# Patient Record
Sex: FEMALE | Race: WHITE | Employment: FULL TIME | RURAL
[De-identification: names, ages, dates, MRNs, and addresses within clinical notes are randomized per-mention and may not be internally consistent; named-entity substitution may affect disease eponyms.]

---

## 2017-06-02 ENCOUNTER — TELEPHONE (OUTPATIENT)
Dept: SURGERY | Age: 52
End: 2017-06-02

## 2017-06-15 ENCOUNTER — TRANSCRIBE ORDERS (OUTPATIENT)
Dept: MAMMOGRAPHY | Facility: HOSPITAL | Age: 52
End: 2017-06-15

## 2017-06-15 ENCOUNTER — HOSPITAL ENCOUNTER (OUTPATIENT)
Dept: MAMMOGRAPHY | Facility: HOSPITAL | Age: 52
Discharge: HOME | End: 2017-06-15
Admitting: OBSTETRICS & GYNECOLOGY

## 2017-06-15 DIAGNOSIS — Z13.9 SCREENING: ICD-10-CM

## 2017-06-15 DIAGNOSIS — Z13.9 SCREENING: Primary | ICD-10-CM

## 2017-06-15 PROCEDURE — 77063 BREAST TOMOSYNTHESIS BI: CPT

## 2017-06-15 PROCEDURE — G0202 SCR MAMMO BI INCL CAD: HCPCS

## 2018-07-05 ENCOUNTER — TRANSCRIBE ORDERS (OUTPATIENT)
Dept: MAMMOGRAPHY | Facility: HOSPITAL | Age: 53
End: 2018-07-05

## 2018-07-05 DIAGNOSIS — Z12.39 BREAST CANCER SCREENING: Primary | ICD-10-CM

## 2018-08-08 ENCOUNTER — APPOINTMENT (OUTPATIENT)
Dept: MAMMOGRAPHY | Facility: HOSPITAL | Age: 53
End: 2018-08-08

## 2018-08-17 ENCOUNTER — HOSPITAL ENCOUNTER (OUTPATIENT)
Dept: MAMMOGRAPHY | Facility: HOSPITAL | Age: 53
Discharge: HOME | End: 2018-08-17
Admitting: OBSTETRICS & GYNECOLOGY

## 2018-08-17 DIAGNOSIS — Z12.39 BREAST CANCER SCREENING: ICD-10-CM

## 2018-08-17 PROCEDURE — 77063 BREAST TOMOSYNTHESIS BI: CPT

## 2018-08-17 PROCEDURE — 77067 SCR MAMMO BI INCL CAD: CPT

## 2019-08-21 ENCOUNTER — TRANSCRIBE ORDERS (OUTPATIENT)
Dept: ADMINISTRATIVE | Facility: HOSPITAL | Age: 54
End: 2019-08-21

## 2019-08-21 DIAGNOSIS — Z12.31 ENCOUNTER FOR SCREENING MAMMOGRAM FOR MALIGNANT NEOPLASM OF BREAST: Primary | ICD-10-CM

## 2019-09-26 ENCOUNTER — APPOINTMENT (OUTPATIENT)
Dept: MAMMOGRAPHY | Facility: HOSPITAL | Age: 54
End: 2019-09-26

## 2019-10-29 ENCOUNTER — HOSPITAL ENCOUNTER (OUTPATIENT)
Dept: CT IMAGING | Facility: HOSPITAL | Age: 54
Discharge: HOME | End: 2019-10-29
Payer: COMMERCIAL

## 2019-10-29 DIAGNOSIS — G89.29 CHRONIC INTRACTABLE HEADACHE, UNSPECIFIED HEADACHE TYPE: ICD-10-CM

## 2019-10-29 DIAGNOSIS — R51.9 CHRONIC INTRACTABLE HEADACHE, UNSPECIFIED HEADACHE TYPE: ICD-10-CM

## 2019-10-29 PROCEDURE — 70450 CT HEAD/BRAIN W/O DYE: CPT

## 2019-11-12 ENCOUNTER — HOSPITAL ENCOUNTER (OUTPATIENT)
Dept: MAMMOGRAPHY | Facility: HOSPITAL | Age: 54
Discharge: HOME | End: 2019-11-12
Admitting: OBSTETRICS & GYNECOLOGY

## 2019-11-12 DIAGNOSIS — Z12.31 ENCOUNTER FOR SCREENING MAMMOGRAM FOR MALIGNANT NEOPLASM OF BREAST: ICD-10-CM

## 2019-11-12 PROCEDURE — 77067 SCR MAMMO BI INCL CAD: CPT

## 2019-11-12 PROCEDURE — 77063 BREAST TOMOSYNTHESIS BI: CPT

## 2020-06-03 ENCOUNTER — HOSPITAL ENCOUNTER (OUTPATIENT)
Dept: GENERAL RADIOLOGY | Facility: HOSPITAL | Age: 55
Discharge: HOME | End: 2020-06-03
Admitting: NURSE PRACTITIONER

## 2020-06-03 ENCOUNTER — LAB (OUTPATIENT)
Dept: LAB | Facility: HOSPITAL | Age: 55
End: 2020-06-03

## 2020-06-03 ENCOUNTER — OFFICE VISIT (OUTPATIENT)
Dept: NEUROLOGY | Facility: CLINIC | Age: 55
End: 2020-06-03

## 2020-06-03 VITALS
BODY MASS INDEX: 29.63 KG/M2 | WEIGHT: 161 LBS | HEART RATE: 77 BPM | DIASTOLIC BLOOD PRESSURE: 60 MMHG | OXYGEN SATURATION: 98 % | TEMPERATURE: 97.2 F | HEIGHT: 62 IN | SYSTOLIC BLOOD PRESSURE: 102 MMHG

## 2020-06-03 DIAGNOSIS — I67.89 CEREBRAL MICROVASCULAR DISEASE: ICD-10-CM

## 2020-06-03 DIAGNOSIS — R51.9 TEMPORAL PAIN: Primary | ICD-10-CM

## 2020-06-03 DIAGNOSIS — G43.009 MIGRAINE WITHOUT AURA AND WITHOUT STATUS MIGRAINOSUS, NOT INTRACTABLE: ICD-10-CM

## 2020-06-03 DIAGNOSIS — G43.709 CHRONIC MIGRAINE WITHOUT AURA WITHOUT STATUS MIGRAINOSUS, NOT INTRACTABLE: ICD-10-CM

## 2020-06-03 DIAGNOSIS — G44.1 OTHER VASCULAR HEADACHE: ICD-10-CM

## 2020-06-03 DIAGNOSIS — M54.2 CERVICAL PAIN (NECK): ICD-10-CM

## 2020-06-03 DIAGNOSIS — R51.9 TEMPORAL PAIN: ICD-10-CM

## 2020-06-03 PROBLEM — H91.90 HEARING LOSS: Status: ACTIVE | Noted: 2019-11-25

## 2020-06-03 PROBLEM — G43.909 MIGRAINE WITHOUT STATUS MIGRAINOSUS, NOT INTRACTABLE: Status: ACTIVE | Noted: 2020-06-03

## 2020-06-03 PROBLEM — F51.01 PRIMARY INSOMNIA: Status: ACTIVE | Noted: 2020-06-03

## 2020-06-03 PROBLEM — J01.00 ACUTE MAXILLARY SINUSITIS: Status: ACTIVE | Noted: 2020-06-03

## 2020-06-03 PROBLEM — J20.9 ACUTE BRONCHITIS: Status: ACTIVE | Noted: 2020-06-03

## 2020-06-03 PROCEDURE — 85025 COMPLETE CBC W/AUTO DIFF WBC: CPT

## 2020-06-03 PROCEDURE — 86140 C-REACTIVE PROTEIN: CPT

## 2020-06-03 PROCEDURE — 86038 ANTINUCLEAR ANTIBODIES: CPT

## 2020-06-03 PROCEDURE — 72040 X-RAY EXAM NECK SPINE 2-3 VW: CPT

## 2020-06-03 PROCEDURE — 80053 COMPREHEN METABOLIC PANEL: CPT

## 2020-06-03 PROCEDURE — 84443 ASSAY THYROID STIM HORMONE: CPT

## 2020-06-03 PROCEDURE — 36415 COLL VENOUS BLD VENIPUNCTURE: CPT

## 2020-06-03 PROCEDURE — 82746 ASSAY OF FOLIC ACID SERUM: CPT

## 2020-06-03 PROCEDURE — 83921 ORGANIC ACID SINGLE QUANT: CPT

## 2020-06-03 PROCEDURE — 85652 RBC SED RATE AUTOMATED: CPT

## 2020-06-03 PROCEDURE — 86431 RHEUMATOID FACTOR QUANT: CPT

## 2020-06-03 PROCEDURE — 84479 ASSAY OF THYROID (T3 OR T4): CPT

## 2020-06-03 PROCEDURE — 84436 ASSAY OF TOTAL THYROXINE: CPT

## 2020-06-03 PROCEDURE — 82306 VITAMIN D 25 HYDROXY: CPT

## 2020-06-03 PROCEDURE — 82607 VITAMIN B-12: CPT

## 2020-06-03 PROCEDURE — 99204 OFFICE O/P NEW MOD 45 MIN: CPT | Performed by: NURSE PRACTITIONER

## 2020-06-03 RX ORDER — ONDANSETRON 4 MG/1
4 TABLET, FILM COATED ORAL EVERY 8 HOURS PRN
COMMUNITY
End: 2022-08-12

## 2020-06-03 RX ORDER — ESTRADIOL 1 MG/1
TABLET ORAL
COMMUNITY
Start: 2020-03-10 | End: 2022-08-12

## 2020-06-03 RX ORDER — PREDNISONE 10 MG/1
TABLET ORAL
Qty: 42 TABLET | Refills: 0 | Status: SHIPPED | OUTPATIENT
Start: 2020-06-03 | End: 2020-07-29

## 2020-06-03 RX ORDER — ACETAMINOPHEN 500 MG
1000 TABLET ORAL EVERY 6 HOURS PRN
COMMUNITY
End: 2022-08-12

## 2020-06-03 RX ORDER — TOPIRAMATE 50 MG/1
50 CAPSULE, EXTENDED RELEASE ORAL NIGHTLY
Qty: 30 CAPSULE | Refills: 2 | Status: SHIPPED | OUTPATIENT
Start: 2020-06-03 | End: 2020-07-24

## 2020-06-03 RX ORDER — MEDROXYPROGESTERONE ACETATE 2.5 MG/1
TABLET ORAL
COMMUNITY
Start: 2020-04-10 | End: 2022-08-12

## 2020-06-03 RX ORDER — RIZATRIPTAN BENZOATE 10 MG/1
10 TABLET, ORALLY DISINTEGRATING ORAL ONCE AS NEEDED
Qty: 9 TABLET | Refills: 5 | Status: SHIPPED | OUTPATIENT
Start: 2020-06-03 | End: 2021-03-05

## 2020-06-03 RX ORDER — ZOLPIDEM TARTRATE 5 MG/1
5 TABLET ORAL NIGHTLY PRN
COMMUNITY

## 2020-06-03 RX ORDER — CETIRIZINE HYDROCHLORIDE 5 MG/1
5 TABLET ORAL DAILY
COMMUNITY

## 2020-06-03 RX ORDER — RIMEGEPANT SULFATE 75 MG/75MG
75 TABLET, ORALLY DISINTEGRATING ORAL DAILY PRN
Qty: 10 TABLET | Refills: 5 | Status: SHIPPED | OUTPATIENT
Start: 2020-06-03 | End: 2021-03-05

## 2020-06-03 NOTE — PATIENT INSTRUCTIONS
TROKENDI XR 50MG 1 PILL EVERY NIGHT-PREVENT MIGRAINES.    NURTEC ODT 75MG TAKE 1 PILL AT ONSET OF MIGRAINE AS NEEDED. MAX 1/DAY. MAX 10/MONTH.-ABORT MIGRAINES.    RIZATRIPTAN 10MG TAKE 1 PILL AT ONSET OF MIGRAINE IF AT HOME. MAY REPEAT ONCE IN 2 HOURS. MAX 2/DAY MAX 9/MONTH. CAN MAKE YOU TIRED. TRY AT HOME FIRST.-ABORT MIGRAINES.    PREDNISONE PACK AT PHARMACY-TAKE THE NEXT TIME THE LEFT TEMPLE PAIN STARTS AND LET US KNOW IF THIS HELPS.    LABS TODAY.    CTA HEAD AND NECK-OUR SCHEDULING WILL CALL YOU.    Xray of neck today.    Start physical therapy-printed order.

## 2020-06-03 NOTE — PROGRESS NOTES
Subjective:     Patient ID: Janell Salgado is a 54 y.o. female.    CC:   Chief Complaint   Patient presents with   • Migraine   • Headache       HPI:   History of Present Illness     This is a very pleasant 54-year-old female who presents for initial neurological evaluation of migraine headaches present for the past 3 years.  She was referred by her primary care provider for further evaluation.    She tells me she gets 2 different types of headaches. Headaches are worsening in severity and frequency.    The first headache is always severe left temple pain, always last 3 days, the left temple is sore to touch, she has throbbing but no stabbing, nausea, photophobia is more than phonophobia, pain is a 7 out of 10 and she has to lay in the bed.  She tells me she will have 1 of these headaches every 10 to 12 days in the last 3 days each time.     The second type of headache she gets is in the middle of the back of her head, skull base, and middle of the forehead, she will have throbbing, pain 4-6 out of 10, nausea, photophobia and phonophobia, lasting 4 to 24 hours and she will have 1 of these every 3 to 4 days. She tells me she knows she has had blood work but she is not sure what type of blood work.  She has not had inflammatory work-up as far as she is aware.  She tells me that her left temple is sore to touch when she gets the specific headaches but there is no rash or swelling in that area that she can recall.  She gets her eyes examined annually.  She knows she is having at least 15-20 headache days per month total with at least 10 days being considered a migraine.  She is really desperate for relief of her migraines.    She tells me she cannot really find any triggers for her headache starting.  She had no headaches prior to 3 years ago.  She did have a right frozen shoulder about 3 years ago but this would be the only change in her health.  No migraines run in her family and she has had no migraines prior  to 3 years ago.  She was prescribed Trokendi XR 25 mg to take by her PCP and she tells me she took this about 6 to 8 weeks but saw no change so she stopped taking it.  She really does not like taking medications.  She was also prescribed rizatriptan to take as needed for migraines but she works and she is fearful to take this.  She is not tried any triptans.  She does have baseline hypotension and would not be a candidate for a beta-blocker.  She actually was referred to an ear nose and throat specialist for noticing left ear hearing loss.      She had an MRI of the brain with and without contrast on 12/9/2019 at [facility] and radiology report reads minimal chronic small vessel ischemic changes with no enhancing lesions including no 7th or 8th nerve complex region lesions.  There was mild right mastoiditis sequela and left frontal sinus inflammation.  This is the radiology interpretation.  I did attempt to review her imaging today and after 2 attempts I am able to see a few views but not the entire MRI.  I do not see any acute intracranial abnormalities and just a few chronic small vessel ischemic changes consistent with her age.    She also has had neck pain, some tension in shoulders for many years, has seen a chiropractor on and off for years with no improvement. Remote xray of C spine in past, unsure of results. Has never done PT and would like to try this. Denies recent trauma.    The following portions of the patient's history were reviewed and updated as appropriate: allergies, current medications, past family history, past medical history, past social history, past surgical history and problem list.    Past Medical History:   Diagnosis Date   • Frozen shoulder 2017   • HL (hearing loss) 2019   • Migraine 2017   • Vision loss        Past Surgical History:   Procedure Laterality Date   • LASER ABLATION         Social History     Socioeconomic History   • Marital status:      Spouse name: Not on  file   • Number of children: Not on file   • Years of education: Not on file   • Highest education level: Not on file   Tobacco Use   • Smoking status: Former Smoker   • Smokeless tobacco: Never Used   • Tobacco comment: 8 years ago   Substance and Sexual Activity   • Alcohol use: Not Currently   • Drug use: Never   • Sexual activity: Yes       Family History   Problem Relation Age of Onset   • Breast cancer Paternal Grandmother    • Anxiety disorder Mother    • Hypertension Father    • Diabetes Father    • Breast cancer Maternal Grandmother    • Stroke Maternal Grandfather         Review of Systems   Constitutional: Positive for fatigue. Negative for chills, fever and unexpected weight change.   HENT: Negative for ear pain, hearing loss, nosebleeds, rhinorrhea and sore throat.    Eyes: Positive for visual disturbance (chronic, has eye exams every year). Negative for photophobia, pain, discharge and itching.   Respiratory: Negative for cough, chest tightness, shortness of breath and wheezing.    Cardiovascular: Negative for chest pain, palpitations and leg swelling.   Gastrointestinal: Positive for nausea. Negative for abdominal pain, blood in stool, constipation, diarrhea and vomiting.   Genitourinary: Negative for dysuria, frequency, hematuria and urgency.   Musculoskeletal: Negative for arthralgias, back pain, gait problem, joint swelling, myalgias, neck pain and neck stiffness.   Skin: Negative for rash and wound.   Allergic/Immunologic: Negative for environmental allergies and food allergies.   Neurological: Positive for headaches. Negative for dizziness, tremors, seizures, syncope, speech difficulty, weakness, light-headedness and numbness.   Hematological: Negative for adenopathy. Does not bruise/bleed easily.   Psychiatric/Behavioral: Positive for sleep disturbance (chronic insomnia-rx ambien from pcp). Negative for agitation, confusion, decreased concentration, hallucinations and suicidal ideas. The patient  "is not nervous/anxious.    All other systems reviewed and are negative.       Objective:  /60   Pulse 77   Temp 97.2 °F (36.2 °C)   Ht 157.5 cm (62\")   Wt 73 kg (161 lb)   SpO2 98%   BMI 29.45 kg/m²     Neurologic Exam     Mental Status   Oriented to person, place, and time.   Registration: recalls 3 of 3 objects. Recall at 5 minutes: recalls 3 of 3 objects. Follows 3 step commands.   Attention: normal. Concentration: normal.   Speech: speech is normal   Level of consciousness: alert  Knowledge: good and consistent with education. Able to perform simple calculations.   Able to name object. Able to read. Able to repeat. Able to write. Normal comprehension.     Cranial Nerves   Cranial nerves II through XII intact.     Motor Exam   Muscle bulk: normal  Overall muscle tone: normal    Strength   Strength 5/5 throughout.     Sensory Exam   Light touch normal.   Vibration normal.   Proprioception normal.   Pinprick normal.     Gait, Coordination, and Reflexes     Gait  Gait: normal    Coordination   Romberg: negative  Finger to nose coordination: normal  Heel to shin coordination: normal  Tandem walking coordination: normal    Tremor   Resting tremor: absent  Intention tremor: absent  Action tremor: absent    Reflexes   Right brachioradialis: 2+  Left brachioradialis: 2+  Right biceps: 2+  Left biceps: 2+  Right triceps: 2+  Left triceps: 2+  Right patellar: 2+  Left patellar: 2+  Right achilles: 2+  Left achilles: 2+  Right : 2+  Left : 2+  Right plantar: normal  Left plantar: normal  Right Law: absent  Left Law: absent  Right ankle clonus: absent  Left ankle clonus: absent      Physical Exam   Constitutional: She is oriented to person, place, and time. She appears well-developed and well-nourished.   Eyes:   Fundoscopic exam:       The right eye shows no papilledema. The right eye shows red reflex.        The left eye shows no papilledema. The left eye shows red reflex.   Neck: Muscular " tenderness present. No spinous process tenderness present. Carotid bruit is not present. No neck rigidity. Decreased range of motion present. No edema and no erythema present.   Cardiovascular: Normal rate, regular rhythm, S2 normal and normal heart sounds.   Pulmonary/Chest: Effort normal and breath sounds normal.   Neurological: She is oriented to person, place, and time. She has normal strength. She has a normal Finger-Nose-Finger Test, a normal Heel to Shin Test, a normal Romberg Test and a normal Tandem Gait Test. Gait normal.   Reflex Scores:       Tricep reflexes are 2+ on the right side and 2+ on the left side.       Bicep reflexes are 2+ on the right side and 2+ on the left side.       Brachioradialis reflexes are 2+ on the right side and 2+ on the left side.       Patellar reflexes are 2+ on the right side and 2+ on the left side.       Achilles reflexes are 2+ on the right side and 2+ on the left side.  Psychiatric: She has a normal mood and affect. Her speech is normal and behavior is normal. Judgment and thought content normal. Cognition and memory are normal.       Assessment/Plan:       Janell was seen today for migraine and headache.    Diagnoses and all orders for this visit:    Temporal pain  -     C-reactive Protein; Future  -     Sedimentation Rate; Future  -     CARRIE by IFA, Reflex 9-biomarkers profile; Future  -     Vitamin D 25 Hydroxy; Future  -     Vitamin B12 & Folate; Future  -     Thyroid Panel With TSH; Future  -     Rheumatoid Factor; Future  -     Comprehensive Metabolic Panel; Future  -     CBC & Differential; Future  -     Methylmalonic Acid, Serum; Future  -     CT Angiogram Neck; Future  -     CT Angiogram Head; Future  -     predniSONE (DELTASONE) 10 MG tablet; Take 6 tabs x 2 days, Take 5 tabs x 2 days, take 4 tabs x 2 days, take 3 tabs x 2 days, take 2 tabs x 2 days and 1 tab x 2 days and stop    Chronic migraine without aura without status migrainosus, not intractable  -      TROKENDI XR 50 MG capsule sustained-release 24 hr; Take 1 capsule by mouth Every Night.  -     Ambulatory Referral to Physical Therapy Evaluate and treat    Cerebral microvascular disease  -     C-reactive Protein; Future  -     Sedimentation Rate; Future  -     CARRIE by IFA, Reflex 9-biomarkers profile; Future  -     Vitamin D 25 Hydroxy; Future  -     Vitamin B12 & Folate; Future  -     Thyroid Panel With TSH; Future  -     Rheumatoid Factor; Future  -     Comprehensive Metabolic Panel; Future  -     CBC & Differential; Future  -     Methylmalonic Acid, Serum; Future  -     CT Angiogram Neck; Future  -     CT Angiogram Head; Future    Other vascular headache  -     CT Angiogram Neck; Future  -     CT Angiogram Head; Future    Migraine without aura and without status migrainosus, not intractable  -     RIMEGEPANT 75 MG dispersible tablet; Take 75 mg by mouth Daily As Needed (migraine).  -     rizatriptan MLT (MAXALT-MLT) 10 MG disintegrating tablet; Place 1 tablet on the tongue 1 (One) Time As Needed for Migraine for up to 1 dose. May repeat in 2 hours if needed    Cervical pain (neck)  -     XR Spine Cervical 2 or 3 View; Future  -     Ambulatory Referral to Physical Therapy Evaluate and treat           Labs today to rule out temporal arteritis or other etiology of worsening headaches.  I do recommend CTA of the head and neck to evaluate for a vasculitis or stenosis possibly contributing to her new onset headaches at age 51.  We will have her restart Trokendi XR 50 mg at night since she seemed to tolerate but did not see improvement on the 25 mg.  Other as needed medications are prescribed.  Prednisone if she has another temple pain headache to see if she responds.  If she does we will refer her to rheumatology for further evaluation.  She will follow-up in office in 8 weeks or sooner if needed.    Reviewed medications, potential side effects and signs and symptoms to report. Discussed risk versus benefits of  treatment plan with patient and/or family-including medications, labs and radiology that may be ordered. Addressed questions and concerns during visit. Patient and/or family verbalized understanding and agree with plan.    Patient Instructions:  TROKENDI XR 50MG 1 PILL EVERY NIGHT-PREVENT MIGRAINES.    NURTEC ODT 75MG TAKE 1 PILL AT ONSET OF MIGRAINE AS NEEDED. MAX 1/DAY. MAX 10/MONTH.-ABORT MIGRAINES.    RIZATRIPTAN 10MG TAKE 1 PILL AT ONSET OF MIGRAINE IF AT HOME. MAY REPEAT ONCE IN 2 HOURS. MAX 2/DAY MAX 9/MONTH. CAN MAKE YOU TIRED. TRY AT HOME FIRST.-ABORT MIGRAINES.    PREDNISONE PACK AT PHARMACY-TAKE THE NEXT TIME THE LEFT TEMPLE PAIN STARTS AND LET US KNOW IF THIS HELPS.    LABS TODAY.    CTA HEAD AND NECK-OUR SCHEDULING WILL CALL YOU.    Xray of neck today.    Start physical therapy-printed order.    During this visit the following were done:  Labs Reviewed []    Labs Ordered [x]    Radiology Reports Reviewed [x]    Radiology Ordered [x]    PCP Records Reviewed [x]    Referring Provider Records Reviewed [x]    ER Records Reviewed []    Hospital Records Reviewed []    History Obtained From Family []    Radiology Images Reviewed [x]    Other Reviewed [x]    Records Requested []      DYAN Noble  6/3/2020

## 2020-06-04 ENCOUNTER — TELEPHONE (OUTPATIENT)
Dept: NEUROLOGY | Facility: CLINIC | Age: 55
End: 2020-06-04

## 2020-06-04 DIAGNOSIS — E55.9 VITAMIN D DEFICIENCY: ICD-10-CM

## 2020-06-04 DIAGNOSIS — E53.8 LOW SERUM VITAMIN B12: Primary | ICD-10-CM

## 2020-06-04 LAB
25(OH)D3 SERPL-MCNC: 28.8 NG/ML (ref 30–100)
ALBUMIN SERPL-MCNC: 4.4 G/DL (ref 3.5–5.2)
ALBUMIN/GLOB SERPL: 2.1 G/DL
ALP SERPL-CCNC: 68 U/L (ref 39–117)
ALT SERPL W P-5'-P-CCNC: 8 U/L (ref 1–33)
ANION GAP SERPL CALCULATED.3IONS-SCNC: 11.4 MMOL/L (ref 5–15)
AST SERPL-CCNC: 20 U/L (ref 1–32)
BASOPHILS # BLD AUTO: 0.02 10*3/MM3 (ref 0–0.2)
BASOPHILS NFR BLD AUTO: 0.3 % (ref 0–1.5)
BILIRUB SERPL-MCNC: 0.3 MG/DL (ref 0.2–1.2)
BUN BLD-MCNC: 9 MG/DL (ref 6–20)
BUN/CREAT SERPL: 11.8 (ref 7–25)
CALCIUM SPEC-SCNC: 9 MG/DL (ref 8.6–10.5)
CHLORIDE SERPL-SCNC: 103 MMOL/L (ref 98–107)
CHROMATIN AB SERPL-ACNC: <10 IU/ML (ref 0–14)
CO2 SERPL-SCNC: 22.6 MMOL/L (ref 22–29)
CREAT BLD-MCNC: 0.76 MG/DL (ref 0.57–1)
CRP SERPL-MCNC: 0.71 MG/DL (ref 0–0.5)
DEPRECATED RDW RBC AUTO: 41.4 FL (ref 37–54)
EOSINOPHIL # BLD AUTO: 0.13 10*3/MM3 (ref 0–0.4)
EOSINOPHIL NFR BLD AUTO: 2 % (ref 0.3–6.2)
ERYTHROCYTE [DISTWIDTH] IN BLOOD BY AUTOMATED COUNT: 11.9 % (ref 12.3–15.4)
ERYTHROCYTE [SEDIMENTATION RATE] IN BLOOD: 11 MM/HR (ref 0–30)
FOLATE SERPL-MCNC: 13.2 NG/ML (ref 4.78–24.2)
GFR SERPL CREATININE-BSD FRML MDRD: 80 ML/MIN/1.73
GLOBULIN UR ELPH-MCNC: 2.1 GM/DL
GLUCOSE BLD-MCNC: 84 MG/DL (ref 65–99)
HCT VFR BLD AUTO: 36.8 % (ref 34–46.6)
HGB BLD-MCNC: 12.3 G/DL (ref 12–15.9)
IMM GRANULOCYTES # BLD AUTO: 0.02 10*3/MM3 (ref 0–0.05)
IMM GRANULOCYTES NFR BLD AUTO: 0.3 % (ref 0–0.5)
LYMPHOCYTES # BLD AUTO: 2.04 10*3/MM3 (ref 0.7–3.1)
LYMPHOCYTES NFR BLD AUTO: 31.9 % (ref 19.6–45.3)
MCH RBC QN AUTO: 31.9 PG (ref 26.6–33)
MCHC RBC AUTO-ENTMCNC: 33.4 G/DL (ref 31.5–35.7)
MCV RBC AUTO: 95.3 FL (ref 79–97)
MONOCYTES # BLD AUTO: 0.33 10*3/MM3 (ref 0.1–0.9)
MONOCYTES NFR BLD AUTO: 5.2 % (ref 5–12)
NEUTROPHILS # BLD AUTO: 3.85 10*3/MM3 (ref 1.7–7)
NEUTROPHILS NFR BLD AUTO: 60.3 % (ref 42.7–76)
NRBC BLD AUTO-RTO: 0 /100 WBC (ref 0–0.2)
PLATELET # BLD AUTO: 264 10*3/MM3 (ref 140–450)
PMV BLD AUTO: 11 FL (ref 6–12)
POTASSIUM BLD-SCNC: 3.7 MMOL/L (ref 3.5–5.2)
PROT SERPL-MCNC: 6.5 G/DL (ref 6–8.5)
RBC # BLD AUTO: 3.86 10*6/MM3 (ref 3.77–5.28)
SODIUM BLD-SCNC: 137 MMOL/L (ref 136–145)
T-UPTAKE NFR SERPL: 1.25 TBI (ref 0.8–1.3)
T4 SERPL-MCNC: 7.88 MCG/DL (ref 4.5–11.7)
TSH SERPL DL<=0.05 MIU/L-ACNC: 0.43 UIU/ML (ref 0.27–4.2)
VIT B12 BLD-MCNC: 308 PG/ML (ref 211–946)
WBC NRBC COR # BLD: 6.39 10*3/MM3 (ref 3.4–10.8)

## 2020-06-04 RX ORDER — VITAMIN B COMPLEX
1 CAPSULE ORAL DAILY
Qty: 90 CAPSULE | Refills: 3 | Status: SHIPPED | OUTPATIENT
Start: 2020-06-04 | End: 2021-03-05

## 2020-06-04 RX ORDER — ERGOCALCIFEROL 1.25 MG/1
50000 CAPSULE ORAL WEEKLY
Qty: 5 CAPSULE | Refills: 2 | Status: SHIPPED | OUTPATIENT
Start: 2020-06-04 | End: 2020-10-12

## 2020-06-04 NOTE — PROGRESS NOTES
Please notify the patient that her vitamin D level is low and we will send in high-dose vitamin D for her to take for 12 weeks and then she can take over-the-counter vitamin D 2000- 4000 units daily.  Her rheumatoid arthritis test is negative.  Her thyroid levels are normal.  Her vitamin B12 level is on the low side of normal at 308 and we like to see this at least over 400 so I would recommend that she take an oral B complex supplement and I will send that to the pharmacy to see if her insurance will cover or she can obtain that over-the-counter as well.  Blood counts liver and kidney function all appear normal.  We are waiting a few more labs to result and we will keep her posted.  Thanks, DYAN Aldana. Fax a copy of labs to PCP.

## 2020-06-04 NOTE — PROGRESS NOTES
Please notify the patient that the x-ray of her cervical spine does show some mild spurring from the level of C5-C6 which is just above the bony prominence on the back of the neck. Also there is straightening of the spinal cord which can cause neck pain and muscle spasms.  I do recommend that she follow through with physical therapy as that should improve her neck pain and some of her headaches.  Please forward a copy of the x-ray to her PCP.  We will follow-up as scheduled in office.  Thanks, DYAN Aldana.     (See lab results too to call patient once)

## 2020-06-04 NOTE — TELEPHONE ENCOUNTER
----- Message from DYAN Noble sent at 6/4/2020  8:16 AM EDT -----  Please notify the patient that the x-ray of her cervical spine does show some mild spurring from the level of C5-C6 which is just above the bony prominence on the back of the neck. Also there is straightening of the spinal cord which can cause neck pain and muscle spasms.  I do recommend that she follow through with physical therapy as that should improve her neck pain and some of her headaches.  Please forward a copy of the x-ray to her PCP.  We will follow-up as scheduled in office.  Thanks, DYAN Aldana.     (See lab results too to call patient once)

## 2020-06-04 NOTE — TELEPHONE ENCOUNTER
----- Message from DYAN Noble sent at 6/4/2020  8:19 AM EDT -----  Please notify the patient that her vitamin D level is low and we will send in high-dose vitamin D for her to take for 12 weeks and then she can take over-the-counter vitamin D 2000- 4000 units daily.  Her rheumatoid arthritis test is negative.  Her thyroid levels are normal.  Her vitamin B12 level is on the low side of normal at 308 and we like to see this at least over 400 so I would recommend that she take an oral B complex supplement and I will send that to the pharmacy to see if her insurance will cover or she can obtain that over-the-counter as well.  Blood counts liver and kidney function all appear normal.  We are waiting a few more labs to result and we will keep her posted.  Thanks, DYAN Aldana. Fax a copy of labs to PCP.

## 2020-06-06 LAB
ANA SER QL IA: NEGATIVE
Lab: NORMAL

## 2020-06-08 RX ORDER — TOPIRAMATE 25 MG/1
25 CAPSULE, EXTENDED RELEASE ORAL 2 TIMES DAILY
Qty: 7 CAPSULE | Refills: 3 | COMMUNITY
Start: 2020-06-08 | End: 2020-07-24

## 2020-06-08 NOTE — PROGRESS NOTES
Please notify patient her autoimmune panel is negative. Fax copy to PCP. We will f/u as scheduled in office. Thanks, DYAN Aldana

## 2020-06-10 LAB
Lab: NORMAL
METHYLMALONATE SERPL-SCNC: 89 NMOL/L (ref 0–378)

## 2020-07-21 ENCOUNTER — APPOINTMENT (OUTPATIENT)
Dept: CT IMAGING | Facility: HOSPITAL | Age: 55
End: 2020-07-21

## 2020-07-22 ENCOUNTER — TELEPHONE (OUTPATIENT)
Dept: NEUROLOGY | Facility: CLINIC | Age: 55
End: 2020-07-22

## 2020-07-22 DIAGNOSIS — G43.009 MIGRAINE WITHOUT AURA AND WITHOUT STATUS MIGRAINOSUS, NOT INTRACTABLE: Primary | ICD-10-CM

## 2020-07-22 NOTE — TELEPHONE ENCOUNTER
Maribel from [facility] called to notify Bubba that 's CT authorization was denied. Maribel would like for Bubba to give her a call to discuss further options.

## 2020-07-22 NOTE — TELEPHONE ENCOUNTER
I called and spoke with [employer] representative and she has me set up to do a peer to peer review tomorrow 7/23/2020 at 1240pm. I gave them our office direct line. Please let me know when they call so I can speak with them. Looks like patient is scheduled for scans tomorrow but they will have to be rescheduled d/t not being approved yet.Thanks, DYAN Aldana

## 2020-07-23 ENCOUNTER — HOSPITAL ENCOUNTER (OUTPATIENT)
Dept: CT IMAGING | Facility: HOSPITAL | Age: 55
Discharge: HOME | End: 2020-07-23

## 2020-07-23 NOTE — TELEPHONE ENCOUNTER
I spoke with the reviewing physician at [facility] and unfortunately at this time with the additional information provided they still do not feel the patient meets criteria for CTAs. cancel tests. The recommendation I have with essentially normal labs and normal for age MRI and HAs being present for 3 years that we continue with treating the headaches and avoid additional imaging. If she gets any stabbing type HAs, recommended she go to ER. Will f/u as scheduled in office. Thanks, DYAN Aldana

## 2020-07-24 RX ORDER — AMITRIPTYLINE HYDROCHLORIDE 10 MG/1
10-20 TABLET, FILM COATED ORAL NIGHTLY
Qty: 60 TABLET | Refills: 2 | Status: SHIPPED | OUTPATIENT
Start: 2020-07-24 | End: 2020-12-09

## 2020-07-24 NOTE — TELEPHONE ENCOUNTER
She can stop the trokendi. I can send in amitriptyline for her to try until her follow up here in office. I will send to pharmacy now. Thanks.

## 2020-07-24 NOTE — TELEPHONE ENCOUNTER
Janell notified. She said she is still not able to get the Trokendi from her pharmacy. She wanted to know if there was another medication she could take. She took all the Trokendi samples and it actually caused her some memory issues. Please advise. She has an appt 7/29/20. She had to change it to a telephone she cannot come into the office.

## 2020-07-29 ENCOUNTER — OFFICE VISIT (OUTPATIENT)
Dept: NEUROLOGY | Facility: CLINIC | Age: 55
End: 2020-07-29

## 2020-07-29 DIAGNOSIS — I67.89 CEREBRAL MICROVASCULAR DISEASE: ICD-10-CM

## 2020-07-29 DIAGNOSIS — G43.009 MIGRAINE WITHOUT AURA AND WITHOUT STATUS MIGRAINOSUS, NOT INTRACTABLE: Primary | ICD-10-CM

## 2020-07-29 DIAGNOSIS — M54.2 CERVICAL PAIN (NECK): ICD-10-CM

## 2020-07-29 DIAGNOSIS — G44.85 STABBING HEADACHE: ICD-10-CM

## 2020-07-29 PROCEDURE — 99442 PR PHYS/QHP TELEPHONE EVALUATION 11-20 MIN: CPT | Performed by: NURSE PRACTITIONER

## 2020-07-29 NOTE — PROGRESS NOTES
Subjective:     Patient ID: Janell Salgado is a 54 y.o. female.    CC:   Chief Complaint   Patient presents with   • Migraine       HPI:   History of Present Illness     Due to COVID-19 Pandemic, this visit was completed via telephone with verbal consent to treat obtained from patient.      You have chosen to receive care through a telephone visit. Do you consent to use a telephone visit for your medical care today? Yes    This is a very pleasant 54-year-old female who presents for 2 month follow up on migraine headaches present for the past 3 years.    She is undergoing a telephone visit today since she is currently in quarantine due to her son-in-law and granddaughter being positive for COVID-19.  She tells me that her family members are doing well and she is negative and will come off quarantine on 8/3/2020 and has no symptoms.     Since her last visit she did have labs with a CBC with differential essentially normal, CRP minimally elevated at 0.71 but we be considered normal for her age, sed rate is normal at 11, CARRIE with reflex negative, vitamin D level low at 28.8 and she is taking high-dose vitamin D, vitamin B12 low normal 308 and folate 13.2 and has been recommended to obtain over-the-counter B complex vitamin, thyroid panel with TSH normal, rheumatoid factor negative, CMP within normal limits and methylmalonic acid normal at 89.  We had also ordered an x-ray of the cervical spine and this showed straightening of the normal lordosis with no evidence of fractures or dislocation.  She does have some mild anterior spurring from C5-C6.  She has not started any physical therapy.  We did order CTA of the head and neck but these were both denied by insurance even with a peer to peer review.      She started topiramate since last visit and even took the Trokendi XR samples I gave her and she tells me she had way too much numbness, tingling and memory issues and so this was stopped.  We did prescribe  amitriptyline for her to start for headache prevention and she tells me she has not started this since being quarantined to her home.  She does plan to start taking this for her headaches.  She does have baseline bradycardia and hypotension so she would not be able to take a beta-blocker.  She has been using rizatriptan and Zyrtec at onset of migraine and this has helped some.  She does still take Zofran when she gets some migraines.  She did take the prednisone taper but could only take about 4 days due to having redness in her face and nervousness.  She does tell me that this did give her relief of her headaches were normally they last about 3 days she took the steroid and the next day she had no pain.    She tells me she gets 2 different types of headaches.      The first headache is always severe left temple pain, always last 3 days, the left temple is sore to touch, she has throbbing but no stabbing, nausea, photophobia is more than phonophobia, pain is a 7 out of 10 and she has to lay in the bed. Continues to get these but not as severe since steroid.     The second type of headache she gets is in the middle of the back of her head, skull base, and middle of the forehead, she will have throbbing, pain 4-6 out of 10, nausea, photophobia and phonophobia, lasting 4 to 24 hours and she will have 1 of these every 3 to 4 days. She knows she is having at least 15-20 headache days per month total with at least 10 days being considered a migraine.      She had an MRI of the brain with and without contrast on 12/9/2019 at [facility] and radiology report reads minimal chronic small vessel ischemic changes with no enhancing lesions including no 7th or 8th nerve complex region lesions.  There was mild right mastoiditis sequela and left frontal sinus inflammation.  This is the radiology interpretation-reviewed imaging last visit in office and I did not see any acute intracranial abnormalities and just a few chronic  small vessel ischemic changes consistent with her age.     The following portions of the patient's history were reviewed and updated as appropriate: allergies, current medications, past family history, past medical history, past social history, past surgical history and problem list.    Past Medical History:   Diagnosis Date   • Frozen shoulder 2017   • HL (hearing loss) 2019   • Migraine 2017   • Vision loss        Past Surgical History:   Procedure Laterality Date   • LASER ABLATION         Social History     Socioeconomic History   • Marital status:      Spouse name: Not on file   • Number of children: Not on file   • Years of education: Not on file   • Highest education level: Not on file   Tobacco Use   • Smoking status: Former Smoker   • Smokeless tobacco: Never Used   • Tobacco comment: 8 years ago   Substance and Sexual Activity   • Alcohol use: Not Currently   • Drug use: Never   • Sexual activity: Yes       Family History   Problem Relation Age of Onset   • Breast cancer Paternal Grandmother    • Anxiety disorder Mother    • Hypertension Father    • Diabetes Father    • Breast cancer Maternal Grandmother    • Stroke Maternal Grandfather         Review of Systems   Constitutional: Negative.    Eyes: Negative.    Respiratory: Negative.    Cardiovascular: Negative.    Gastrointestinal: Negative.    Endocrine: Negative.    Genitourinary: Negative.    Musculoskeletal: Positive for neck pain.   Skin: Negative.    Allergic/Immunologic: Positive for environmental allergies.   Neurological: Positive for headaches (temple pains intermittent).   Hematological: Negative.    Psychiatric/Behavioral: Positive for sleep disturbance. Negative for self-injury and suicidal ideas. The patient is nervous/anxious.         Objective:  There were no vitals taken for this visit.  TEZ D/T TELEPHONE VISIT  Neurologic Exam     Mental Status   Oriented to person, place, and time.   Speech: speech is normal   Level of  consciousness: alert      Physical Exam   Constitutional: She is oriented to person, place, and time.   Neurological: She is oriented to person, place, and time.   Psychiatric: Her speech is normal.   OTHER EXAM TEZ D/T TELEPHONE VISIT    Assessment/Plan:       Janell was seen today for migraine.    Diagnoses and all orders for this visit:    Migraine without aura and without status migrainosus, not intractable  Comments:  start amitriptyline for prevention. continue maxalt or nurtec prn.    Cerebral microvascular disease  Comments:  monitor, consider adding aspirin 81mg daily, very minimal on MRI Brain consistent with migraines    Stabbing headache  Comments:  if MRA normal, consider referral to rheumatology although inflammatory markers ok and CARRIE negative  Orders:  -     MRI Angiogram Head Without Contrast; Future    Cervical pain (neck)  Comments:  call for new PT order once able to complete         Total time of telephone visit 12 minutes.  Start amitriptyline for migraine prevention when she is out of quarantine.  Recommend she start this without using her Ambien which she takes at night as needed.  She needs to see how she responds to the amitriptyline alone but for adding Ambien with that.  Encouraged her to continue to use the Maxalt or nurtec as needed.  We will try to get an MRA of the head approved for continued stabbing type headaches.  Consider rheumatology referral if she continues to have temporal type pains.  Follow-up in office in 8 weeks or sooner if needed. Reviewed medications, potential side effects and signs and symptoms to report. Discussed risk versus benefits of treatment plan with patient and/or family-including medications, labs and radiology that may be ordered. Addressed questions and concerns during visit. Patient and/or family verbalized understanding and agree with plan.    During this visit the following were done:  Labs Reviewed [x]    Labs Ordered []    Radiology Reports Reviewed  [x]    Radiology Ordered [x]    PCP Records Reviewed []    Referring Provider Records Reviewed []    ER Records Reviewed []    Hospital Records Reviewed []    History Obtained From Family []    Radiology Images Reviewed []    Other Reviewed [x]    Records Requested []      Shona Mcpherson, APRN  7/29/2020

## 2020-08-13 ENCOUNTER — APPOINTMENT (OUTPATIENT)
Dept: MRI IMAGING | Facility: HOSPITAL | Age: 55
End: 2020-08-13

## 2020-09-09 ENCOUNTER — TELEPHONE (OUTPATIENT)
Dept: NEUROLOGY | Facility: CLINIC | Age: 55
End: 2020-09-09

## 2020-09-09 NOTE — TELEPHONE ENCOUNTER
I let pt know the insurance will not approve the imaging at this time and she said the most recent medicine seems to be helping some

## 2020-09-09 NOTE — TELEPHONE ENCOUNTER
Please notify the patient for some reason I cannot get the MRA of her head covered and I could not get the CTA of her head and neck covered.  Unfortunately her insurance will not approve this regardless of the 2 different diagnoses we have tried to use.  We have provided notes and reasons for the test even though the letter from her insurance says we have not.  I have seen those letters and reasons sent to the insurance.  Unfortunately at this time I am not able to get these approved.  If her headaches are doing okay I would recommend we wait on any additional imaging.  We will follow-up as scheduled in office.  Thanks, DYAN Aldana.

## 2020-10-12 DIAGNOSIS — E55.9 VITAMIN D DEFICIENCY: ICD-10-CM

## 2020-10-12 RX ORDER — ERGOCALCIFEROL 1.25 MG/1
CAPSULE ORAL
Qty: 3 CAPSULE | Refills: 2 | Status: SHIPPED | OUTPATIENT
Start: 2020-10-12 | End: 2021-03-05

## 2020-12-08 ENCOUNTER — TRANSCRIBE ORDERS (OUTPATIENT)
Dept: ADMINISTRATIVE | Facility: HOSPITAL | Age: 55
End: 2020-12-08

## 2020-12-08 DIAGNOSIS — Z12.31 VISIT FOR SCREENING MAMMOGRAM: Primary | ICD-10-CM

## 2020-12-08 DIAGNOSIS — G43.009 MIGRAINE WITHOUT AURA AND WITHOUT STATUS MIGRAINOSUS, NOT INTRACTABLE: ICD-10-CM

## 2020-12-09 ENCOUNTER — HOSPITAL ENCOUNTER (OUTPATIENT)
Dept: MAMMOGRAPHY | Facility: HOSPITAL | Age: 55
Discharge: HOME | End: 2020-12-09
Admitting: OBSTETRICS & GYNECOLOGY

## 2020-12-09 DIAGNOSIS — Z12.31 VISIT FOR SCREENING MAMMOGRAM: ICD-10-CM

## 2020-12-09 PROCEDURE — 77063 BREAST TOMOSYNTHESIS BI: CPT

## 2020-12-09 PROCEDURE — 77067 SCR MAMMO BI INCL CAD: CPT

## 2020-12-09 RX ORDER — AMITRIPTYLINE HYDROCHLORIDE 10 MG/1
10-20 TABLET, FILM COATED ORAL NIGHTLY
Qty: 60 TABLET | Refills: 3 | Status: SHIPPED | OUTPATIENT
Start: 2020-12-09 | End: 2021-03-05

## 2020-12-09 NOTE — TELEPHONE ENCOUNTER
Happy to refill medication. Patient needs a follow up via mychart video visit, telephone or in person in the next 2-3 months please schedule this. Thanks.

## 2020-12-18 ENCOUNTER — TELEPHONE (OUTPATIENT)
Dept: NEUROLOGY | Facility: CLINIC | Age: 55
End: 2020-12-18

## 2020-12-18 DIAGNOSIS — G43.009 MIGRAINE WITHOUT AURA AND WITHOUT STATUS MIGRAINOSUS, NOT INTRACTABLE: Primary | ICD-10-CM

## 2020-12-18 RX ORDER — GALCANEZUMAB 120 MG/ML
120 INJECTION, SOLUTION SUBCUTANEOUS
Qty: 1 PEN | Refills: 5 | Status: SHIPPED | OUTPATIENT
Start: 2020-12-18 | End: 2021-03-05

## 2020-12-18 RX ORDER — GALCANEZUMAB 120 MG/ML
240 INJECTION, SOLUTION SUBCUTANEOUS ONCE
Qty: 2 PEN | Refills: 0 | Status: SHIPPED | OUTPATIENT
Start: 2020-12-18 | End: 2020-12-18

## 2020-12-18 NOTE — TELEPHONE ENCOUNTER
So we can increase the Amitriptyline OR we can add Emgality injections if her insurance will cover them. I can send the Emgality in to pharmacy and she will need a PA. Also she should go to Emgality.com and download the copay savings card to give to her pharmacy to keep cost down. I would recommend she stay on the amitriptyline for now until she is on Emgality 2-3 months and sees improvement before stopping. Thanks, DYAN Aldana

## 2020-12-18 NOTE — TELEPHONE ENCOUNTER
PT CALLED IN TODAY REGARDING HER AMITRIPTYLINE THAT DYAN RAJAN HAS HER ON. SHE STATES SHE'S BEEN ON IT FOR A WHILE BUT STATES AS OF THE LAST COUPLE OF MONTHS, SHE'S HAD ONE-TWO HEADACHES PER WEEK AGAIN AND SHE WOULD LIKE TO KNOW IF THERE'S ANOTHER MEDICATION THAT IFEANYI WOULD MIND PUTTING HER ON. PT STATES HER NIECE TAKES EMGALITY AND SHE WAS WONDERING IF THAT'S AN INJECTION IFEANYI THINKS PT WOULD BENEFIT ON. PLEASE REVIEW AND ADVISE.      CALL BACK- [phone]

## 2020-12-18 NOTE — TELEPHONE ENCOUNTER
I spoke with pt and she said she would like to try the emgality and would like to increase/double her amitriptyline and per Shona she said this would be ok.  I will complete PA for Emgality.

## 2020-12-21 NOTE — TELEPHONE ENCOUNTER
I SPOKE WITH PT AND LET HER KNOW THE PA FOR HER EMGALITY WAS COMPLETED BUT WAITING ON APPROVAL/DENIAL AND I DID PLACE TWO EMGALITY INJECTIONS BACK FOR PT TO  ON 12-

## 2020-12-21 NOTE — TELEPHONE ENCOUNTER
PT CALLING TO STATE THAT THE [facility] IN [place], [place] STATES THEY ARE WAITING ON PA TO FILL EMGALITY MEDICATION. PT WAS WONDERING IF SHE COULD BE PROVIDED SAMPLES UNTIL MEDICATION WAS FILLED BY PHARMACY.    PT CAN BE REACHED AT [phone].    PLEASE ADVISE.

## 2021-03-05 ENCOUNTER — OFFICE VISIT (OUTPATIENT)
Dept: NEUROLOGY | Facility: CLINIC | Age: 56
End: 2021-03-05

## 2021-03-05 ENCOUNTER — TELEPHONE (OUTPATIENT)
Dept: NEUROLOGY | Facility: CLINIC | Age: 56
End: 2021-03-05

## 2021-03-05 DIAGNOSIS — G43.009 MIGRAINE WITHOUT AURA AND WITHOUT STATUS MIGRAINOSUS, NOT INTRACTABLE: Primary | ICD-10-CM

## 2021-03-05 DIAGNOSIS — E53.8 LOW SERUM VITAMIN B12: ICD-10-CM

## 2021-03-05 DIAGNOSIS — I67.89 CEREBRAL MICROVASCULAR DISEASE: ICD-10-CM

## 2021-03-05 PROCEDURE — 99441 PR PHYS/QHP TELEPHONE EVALUATION 5-10 MIN: CPT | Performed by: NURSE PRACTITIONER

## 2021-03-05 RX ORDER — VITAMIN B COMPLEX
1 CAPSULE ORAL DAILY
Qty: 90 CAPSULE | Refills: 3 | Status: SHIPPED | OUTPATIENT
Start: 2021-03-05 | End: 2022-03-05

## 2021-03-05 RX ORDER — AMITRIPTYLINE HYDROCHLORIDE 10 MG/1
10 TABLET, FILM COATED ORAL NIGHTLY
Qty: 90 TABLET | Refills: 3 | Status: SHIPPED | OUTPATIENT
Start: 2021-03-05 | End: 2022-08-12

## 2021-03-05 RX ORDER — RIZATRIPTAN BENZOATE 10 MG/1
10 TABLET, ORALLY DISINTEGRATING ORAL ONCE AS NEEDED
Qty: 27 TABLET | Refills: 3 | Status: SHIPPED | OUTPATIENT
Start: 2021-03-05 | End: 2022-08-12

## 2021-03-05 RX ORDER — GALCANEZUMAB 120 MG/ML
120 INJECTION, SOLUTION SUBCUTANEOUS
Qty: 1 PEN | Refills: 11 | Status: SHIPPED | OUTPATIENT
Start: 2021-03-05 | End: 2021-04-19 | Stop reason: SINTOL

## 2021-03-05 NOTE — TELEPHONE ENCOUNTER
----- Message from DYAN Noble sent at 3/5/2021 12:50 PM EST -----  Completed telephone visit.  Please call patient and schedule 4 month follow-up in clinic.  Check in and out.  Thanks

## 2021-03-05 NOTE — PROGRESS NOTES
Subjective:     Patient ID: Janell Salgado is a 55 y.o. female.    CC:   Chief Complaint   Patient presents with   • Migraine       HPI:   History of Present Illness     Due to COVID-19 Pandemic, this visit was completed via telephone with verbal consent to treat obtained from patient.      You have chosen to receive care through a telephone visit. Do you consent to use a telephone visit for your medical care today? Yes    This is a pleasant 55-year-old female who presents for 6-month follow-up on migraine headaches present since 2019.  Since last visit we started her on Emgality injections monthly and she has seen significant improvement in her migraines.  She is rarely requiring the abortive medications.  She feels like she is doing so much better overall.  She is not having any more of her stabbing headaches.  She is also taking amitriptyline 10 mg at bedtime as well.  She is on a B complex.  She uses rizatriptan as needed.  She has no new concerns today.  She is so pleased with her improvement with migraines.  Previously she was having stabbing headaches but not having stabbing headaches anymore.  MRA of the head was denied.      She had an MRI of the brain with and without contrast on 12/9/2019 at [facility] and radiology report reads minimal chronic small vessel ischemic changes with no enhancing lesions including no 7th or 8th nerve complex region lesions.  There was mild right mastoiditis sequela and left frontal sinus inflammation.  This is the radiology interpretation-reviewed imaging previously & did not see any acute intracranial abnormalities and just a few chronic small vessel ischemic changes consistent with her age.    The following portions of the patient's history were reviewed and updated as appropriate: allergies, current medications, past family history, past medical history, past social history, past surgical history and problem list.    Past Medical History:   Diagnosis Date   •  Frozen shoulder 2017   • HL (hearing loss) 2019   • Migraine 2017   • Vision loss        Past Surgical History:   Procedure Laterality Date   • LASER ABLATION         Social History     Socioeconomic History   • Marital status:      Spouse name: Not on file   • Number of children: Not on file   • Years of education: Not on file   • Highest education level: Not on file   Tobacco Use   • Smoking status: Former Smoker   • Smokeless tobacco: Never Used   • Tobacco comment: 8 years ago   Substance and Sexual Activity   • Alcohol use: Not Currently   • Drug use: Never   • Sexual activity: Yes       Family History   Problem Relation Age of Onset   • Breast cancer Paternal Grandmother    • Anxiety disorder Mother    • Hypertension Father    • Diabetes Father    • Breast cancer Maternal Grandmother    • Stroke Maternal Grandfather         Review of Systems   Neurological: Positive for headaches.   All other systems reviewed and are negative.       Objective:  There were no vitals taken for this visit.   Not obtained due to telephone visit    Neurologic Exam     Mental Status   Oriented to person, place, and time.   Speech: speech is normal   Level of consciousness: alert      Physical Exam  Neurological:      Mental Status: She is oriented to person, place, and time.   Psychiatric:         Mood and Affect: Mood is anxious.         Speech: Speech normal.          Unable to assess fully due to telephone visit    Assessment/Plan:       Diagnoses and all orders for this visit:    1. Migraine without aura and without status migrainosus, not intractable (Primary)  -     amitriptyline (ELAVIL) 10 MG tablet; Take 1 tablet by mouth Every Night.  Dispense: 90 tablet; Refill: 3  -     rizatriptan MLT (MAXALT-MLT) 10 MG disintegrating tablet; Place 1 tablet on the tongue 1 (One) Time As Needed for Migraine for up to 1 dose. May repeat in 2 hours if needed  Dispense: 27 tablet; Refill: 3  -     Emgality 120 MG/ML prefilled  syringe; Inject 1 mL under the skin into the appropriate area as directed Every 30 (Thirty) Days.  Dispense: 1 pen; Refill: 11    2. Cerebral microvascular disease  Comments:  Recommend starting aspirin 81 mg daily.  CTA of the head and neck as well as MRA of the head have all been denied by insurance.  Headaches are significantly imp    3. Low serum vitamin B12  -     b complex vitamins capsule; Take 1 capsule by mouth Daily.  Dispense: 90 capsule; Refill: 3           Total time of telephone visit 7 minutes.  We will follow-up in 6 months or sooner if needed.  Reviewed medications, potential side effects and signs and symptoms to report. Discussed risk versus benefits of treatment plan with patient and/or family-including medications, labs and radiology that may be ordered. Addressed questions and concerns during visit. Patient and/or family verbalized understanding and agree with plan.    Shona Mcpherson, APRN  3/5/2021

## 2021-04-19 ENCOUNTER — TELEPHONE (OUTPATIENT)
Dept: NEUROLOGY | Facility: CLINIC | Age: 56
End: 2021-04-19

## 2021-04-19 DIAGNOSIS — G43.009 MIGRAINE WITHOUT AURA AND WITHOUT STATUS MIGRAINOSUS, NOT INTRACTABLE: ICD-10-CM

## 2021-04-19 DIAGNOSIS — T80.89XA IRRITATION OF INJECTION SITE, INITIAL ENCOUNTER: Primary | ICD-10-CM

## 2021-04-19 RX ORDER — FREMANEZUMAB-VFRM 225 MG/1.5ML
225 INJECTION SUBCUTANEOUS
Qty: 1 PEN | Refills: 11 | Status: SHIPPED | OUTPATIENT
Start: 2021-04-19 | End: 2022-08-12

## 2021-04-19 RX ORDER — METHYLPREDNISOLONE 4 MG/1
TABLET ORAL
Qty: 21 TABLET | Refills: 0 | Status: SHIPPED | OUTPATIENT
Start: 2021-04-19 | End: 2022-08-12

## 2021-04-19 NOTE — TELEPHONE ENCOUNTER
The patient stated that she would like to start the Ajovy injections. She would like to see if you can call her in a steroid prescription because she has already been using the benadryl and hydrocortisone.

## 2021-04-19 NOTE — TELEPHONE ENCOUNTER
So 2 options.  One would be changing the site of injection completely and continuing the current medication.  However if she is having large welts we would recommend discontinuing the Emgality injections and switching to either Ajovy injections monthly or trying Vyepti infusions once every 3 months in our outpatient infusion center.  Please let me know which patient would like to try.  They both are similar to Emgality but work slightly different.  One is an autoinjector but may have less skin irritation.  The other one is an IV infusion.  Both would have to run through her insurance before being scheduled.  Thanks, DYAN Aldana.

## 2021-04-19 NOTE — TELEPHONE ENCOUNTER
Sent steroid to pharmacy. Stop emgality injections. Will send in Ajovy script. Make sure patient is rotating injection sites monthly from either side of abdomen and back of arms. Thanks, DYAN Aldana

## 2021-04-19 NOTE — TELEPHONE ENCOUNTER
Caller: Janell Salgado    Relationship: Self    Best call back number: [phone]    What medications are you currently taking:   Current Outpatient Medications on File Prior to Visit   Medication Sig Dispense Refill   • acetaminophen (TYLENOL) 500 MG tablet Take 1,000 mg by mouth Every 6 (Six) Hours As Needed for Mild Pain .     • amitriptyline (ELAVIL) 10 MG tablet Take 1 tablet by mouth Every Night. 90 tablet 3   • b complex vitamins capsule Take 1 capsule by mouth Daily. 90 capsule 3   • cetirizine (zyrTEC) 5 MG tablet Take 5 mg by mouth Daily.     • Emgality 120 MG/ML prefilled syringe Inject 1 mL under the skin into the appropriate area as directed Every 30 (Thirty) Days. 1 pen 11   • estradiol (ESTRACE) 1 MG tablet      • medroxyPROGESTERone (PROVERA) 2.5 MG tablet      • ondansetron (ZOFRAN) 4 MG tablet Take 4 mg by mouth Every 8 (Eight) Hours As Needed for Nausea or Vomiting.     • rizatriptan MLT (MAXALT-MLT) 10 MG disintegrating tablet Place 1 tablet on the tongue 1 (One) Time As Needed for Migraine for up to 1 dose. May repeat in 2 hours if needed 27 tablet 3   • zolpidem (AMBIEN) 5 MG tablet Take 5 mg by mouth At Night As Needed for Sleep.       No current facility-administered medications on file prior to visit.        When did you start taking these medications:  4-5 MONTHS     Which medication are you concerned about:   Emgality 120 MG/ML prefilled syringe    Who prescribed you this medication: IFEANYI PATTON     What are your concerns: THE PATIENT HAS BEEN HAVING AN ALLERGIC REACTION IN THE AREA WHERE SHE INJECTS HERSELF, CURRENTLY SHE HAS A BIG WHELP OF THE SIZE OF HER HAND AND IT ITCHES HORRIBLE. SHE WANTS TO KNOW IF THERE IS ANOTHER OPTION INSTEAD OF THESE INJECTIONS.     How long have you had these concerns:   THESE PAST TWO MONTHS.

## 2021-05-10 ENCOUNTER — TELEPHONE (OUTPATIENT)
Dept: NEUROLOGY | Facility: CLINIC | Age: 56
End: 2021-05-10

## 2021-05-10 NOTE — TELEPHONE ENCOUNTER
Provider: KATERINE PATTON    Caller: BEAU MEADOWS    Relationship to Patient: SELF    Pharmacy: NA    Phone Number: [phone]    Reason for Call:   THE PATIENT IS JUST FOLLOWING UP ON A PRE AUTH REQUEST FOR Ajovy 225 MG/1.5ML solution auto-injector.   PLEASE ADVISE.

## 2021-05-11 ENCOUNTER — PRIOR AUTHORIZATION (OUTPATIENT)
Dept: NEUROLOGY | Facility: CLINIC | Age: 56
End: 2021-05-11

## 2021-05-11 NOTE — TELEPHONE ENCOUNTER
I have completed a pa for the ajovy and waiting on the denial or approval through cover my meds, when I have the decision the pharmacy will let pt know

## 2022-02-02 ENCOUNTER — TRANSCRIBE ORDERS (OUTPATIENT)
Dept: ADMINISTRATIVE | Facility: HOSPITAL | Age: 57
End: 2022-02-02

## 2022-02-02 DIAGNOSIS — Z12.31 BREAST CANCER SCREENING BY MAMMOGRAM: Primary | ICD-10-CM

## 2022-04-15 ENCOUNTER — HOSPITAL ENCOUNTER (OUTPATIENT)
Dept: MAMMOGRAPHY | Facility: HOSPITAL | Age: 57
Discharge: HOME | End: 2022-04-15
Admitting: OBSTETRICS & GYNECOLOGY

## 2022-04-15 DIAGNOSIS — Z12.31 BREAST CANCER SCREENING BY MAMMOGRAM: ICD-10-CM

## 2022-04-15 PROCEDURE — 77067 SCR MAMMO BI INCL CAD: CPT

## 2022-04-15 PROCEDURE — 77063 BREAST TOMOSYNTHESIS BI: CPT

## 2022-04-18 DIAGNOSIS — R92.8 ABNORMAL MAMMOGRAM: Primary | ICD-10-CM

## 2022-05-20 ENCOUNTER — HOSPITAL ENCOUNTER (OUTPATIENT)
Dept: ULTRASOUND IMAGING | Facility: HOSPITAL | Age: 57
Discharge: HOME | End: 2022-05-20

## 2022-05-20 ENCOUNTER — HOSPITAL ENCOUNTER (OUTPATIENT)
Dept: MAMMOGRAPHY | Facility: HOSPITAL | Age: 57
Discharge: HOME | End: 2022-05-20

## 2022-05-20 DIAGNOSIS — R92.8 ABNORMAL MAMMOGRAM: ICD-10-CM

## 2022-05-20 PROCEDURE — 77066 DX MAMMO INCL CAD BI: CPT

## 2022-05-20 PROCEDURE — 77065 DX MAMMO INCL CAD UNI: CPT

## 2022-05-20 PROCEDURE — 76641 ULTRASOUND BREAST COMPLETE: CPT

## 2022-05-20 PROCEDURE — G0279 TOMOSYNTHESIS, MAMMO: HCPCS

## 2022-08-12 ENCOUNTER — OFFICE VISIT (OUTPATIENT)
Dept: OBSTETRICS AND GYNECOLOGY | Facility: CLINIC | Age: 57
End: 2022-08-12

## 2022-08-12 VITALS
DIASTOLIC BLOOD PRESSURE: 74 MMHG | BODY MASS INDEX: 29.81 KG/M2 | HEIGHT: 62 IN | SYSTOLIC BLOOD PRESSURE: 128 MMHG | WEIGHT: 162 LBS

## 2022-08-12 DIAGNOSIS — N95.1 MENOPAUSAL SYMPTOMS: ICD-10-CM

## 2022-08-12 DIAGNOSIS — Z01.419 ENCOUNTER FOR GYNECOLOGICAL EXAMINATION (GENERAL) (ROUTINE) WITHOUT ABNORMAL FINDINGS: Primary | ICD-10-CM

## 2022-08-12 DIAGNOSIS — Z79.890 HORMONE REPLACEMENT THERAPY (HRT): ICD-10-CM

## 2022-08-12 DIAGNOSIS — Z12.31 ENCOUNTER FOR SCREENING MAMMOGRAM FOR BREAST CANCER: ICD-10-CM

## 2022-08-12 DIAGNOSIS — N95.2 POSTMENOPAUSAL ATROPHIC VAGINITIS: ICD-10-CM

## 2022-08-12 PROCEDURE — 99386 PREV VISIT NEW AGE 40-64: CPT | Performed by: OBSTETRICS & GYNECOLOGY

## 2022-08-12 PROCEDURE — 99214 OFFICE O/P EST MOD 30 MIN: CPT | Performed by: OBSTETRICS & GYNECOLOGY

## 2022-08-12 RX ORDER — SULFAMETHOXAZOLE AND TRIMETHOPRIM 800; 160 MG/1; MG/1
TABLET ORAL
COMMUNITY
Start: 2022-08-10

## 2022-08-12 RX ORDER — ESTRADIOL 10 UG/1
1 INSERT VAGINAL 2 TIMES WEEKLY
Qty: 8 TABLET | Refills: 11 | Status: SHIPPED | OUTPATIENT
Start: 2022-08-15

## 2022-08-12 RX ORDER — MELOXICAM 15 MG/1
1 TABLET ORAL DAILY
COMMUNITY
Start: 2022-05-11

## 2022-08-14 NOTE — PROGRESS NOTES
Chief Complaint  Gynecologic Exam     History of Present Illness:  Patient is 56 y.o.  who presents to [facility] here as a new patient for her annual examination as well as discussion of hormone replacement therapy.  Patient reports her last Pap smear was approximately 2 years ago.  Patient had been seeing Dr. Galvin for many years.  Patient has been on hormone replacement therapy for many years as well.  She has been on estradiol as well as Provera daily.  Patient has not had any vaginal bleeding for many years.  She does report being off of her hormone replacement therapy since January of this year.  Patient does report having a history of migraines.  She reports since being off of her hormone replacement therapy she has not had any further migraines.  Patient does have occasional hot flashes as well as night sweats.  She also reports having vaginal dryness.  Patient also reports having urinary frequency and urgency.  She also has complaints of weight gain.  Patient sees Dr. Keyes in [place] for her primary care.  She reports her last colonoscopy was over 10 years ago.  Patient did have a mammogram earlier this year with follow-up imaging and ultrasound.  Patient is inquiring regarding other medications for treatment of her menopausal symptoms.  Patient has been on an SSRI in the past.  Patient reports she was unable to tolerate.  She reports feeling like a zombie.  Patient was on her medicine for approximately 1 week.    History  Past Medical History:   Diagnosis Date   • Frozen shoulder    • HL (hearing loss) 2019   • Migraine 2017   • Vision loss      Current Outpatient Medications on File Prior to Visit   Medication Sig Dispense Refill   • meloxicam (Mobic) 15 MG tablet Take 1 tablet by mouth Daily.     • Calcium Carbonate-Vitamin D3 600-400 MG-UNIT tablet      • cetirizine (zyrTEC) 5 MG tablet Take 5 mg by mouth Daily.     • sulfamethoxazole-trimethoprim (BACTRIM  "DS,SEPTRA DS) 800-160 MG per tablet      • zolpidem (AMBIEN) 5 MG tablet Take 5 mg by mouth At Night As Needed for Sleep.       No current facility-administered medications on file prior to visit.     Allergies   Allergen Reactions   • Penicillins Hives and Rash     rash     Past Surgical History:   Procedure Laterality Date   • LASER ABLATION       Family History   Problem Relation Age of Onset   • Breast cancer Paternal Grandmother    • Anxiety disorder Mother    • Hypertension Father    • Diabetes Father    • Breast cancer Maternal Grandmother    • Stroke Maternal Grandfather      Social History     Socioeconomic History   • Marital status:    Tobacco Use   • Smoking status: Former Smoker   • Smokeless tobacco: Never Used   • Tobacco comment: 8 years ago   Substance and Sexual Activity   • Alcohol use: Not Currently   • Drug use: Never   • Sexual activity: Yes       Physical Examination:  Vital Signs: /74   Ht 157.5 cm (62\")   Wt 73.5 kg (162 lb)   BMI 29.63 kg/m²     General Appearance: alert, appears stated age, and cooperative  Breasts: Examined in supine position  Symmetric without masses or skin dimpling  Nipples normal without inversion, lesions or discharge  There are no palpable axillary nodes  Abdomen: no masses, no hepatomegaly, no splenomegaly, soft non-tender, no guarding, and no rebound tenderness  Pelvic: Clinical staff was present for exam  External genitalia:  normal appearance of the external genitalia including Bartholin's and [place]'s glands.  :  urethral meatus normal;  Vaginal: Atrophic changes noted  Cervix:  normal appearance.  Uterus:  normal size, shape and consistency.  Adnexa:  normal bimanual exam of the adnexa.  Pap smear done and specimen sent using Thin-Prep technique    Data Review:      Labs:    Imaging:  Mammo Screening Digital Tomosynthesis Bilateral With CAD (04/15/2022 08:45)  US Breast Left Complete (05/20/2022 11:51)  Mammo Diagnostic Digital Tomosynthesis " Left With CAD (05/20/2022 12:15)  SCANNED - IMAGING (12/09/2019)    Medical Records:  Progress Notes by Shona Mcpherson APRN (03/05/2021 11:00)      Assessment and Plan   1. Encounter for gynecological examination (general) (routine) without abnormal findings  Pap was done today.  If she does not receive the results of the Pap within 2 weeks  time, she was instructed to call to find out the results.  I explained to Janell that the recommendations for Pap smear interval in a low risk patient has lengthened to 3 years time if cytology alone normal or  5 years time if both cytology and HPV testing were normal.  I encouraged her to be seen yearly for a full physical exam including breast and pelvic exam even during the off years when PAP's will not be performed.  Patient to sign to obtain a copy of her medical records.  - LIQUID-BASED PAP SMEAR, [facility] ([facility])    2. Encounter for screening mammogram for breast cancer  It is recommended per ACOG, for women at average risk to start annual mammogram screening at the age of 40 until the age of 75 and an individualized decision be made for women after age 75.  She was encouraged to continue getting yearly mammograms.  She should report any palpable breast lump(s) or skin changes regardless of mammographic findings.  I explained to Janell that notification regarding her mammogram results will come from the center performing the study.  Our office will not be routinely calling with mammogram results.  It is her responsibility to make sure that the results from the mammogram are communicated to her by the breast center.  If she has any questions about the results, she is welcome to call our office anytime.  The patient reports she has done her mammogram and results are noted.    Janell was counseled regarding having clinical breast exams and breast self-awareness.  Women aged 29-39 years of age should have clinical breast exams every 1-3 years and yearly aged 40  and older.  The patient was counseled regarding breast self-awareness focusing on having a sense of what is normal for her breasts so that she can tell if there are changes.  Even small changes should be reported to provider.  - Mammo Screening Digital Tomosynthesis Bilateral With CAD; Future    3. Menopausal symptoms  The various options for the management of menopausal symptoms was discussed.  The medical treatment options discussed include HRT, SSRIs, SSNRIs, clonidine, and gabapentin.  The risks and benefits were discussed including the findings from the WHI study.  The increased risk of breast cancer, CAD, stroke, and VTE events were discussed for combination therapy vs the increased risk of CV events and breast cancer not being seen in the estrogen only group.  The lowest effective dose for the shortest duration of treatment was discussed in regards to HRT.  Other alternatives including otc supplements and lifestyle changes were also discussed.  Local estrogen therapy to relieve atrophic vaginal symptoms was discussed was well as other alternatives.  Patient desires a trial with Estroven at this time.  Instructions and precautions are given.  Patient is to stay off her hormone replacement therapy at present.  Instructions and precautions are given.  Patient is to call if no improvement and/or worsening of her symptoms as discussed.    4. Postmenopausal atrophic vaginitis  Discussed various options for relief of atrophic vaginal symptoms related to menopause. Discussed local therapy for treatment of vaginal symptoms only.  Discussed the different formulation options including cream, ring, and tablets.  Discussed the low risk of systemic absorption in postmenopausal women with atrophy using 25 mcgs of estradiol on a daily basis.  Recommend low dose use 2-3x/wk for maintenance of treatment.  Other treatment options were discussed including the use of water-based and silicone-based vaginal lubricants and  moisturizers.  Also discussed was the FDA approved treatment option of ospemifene for moderate to severe dyspareunia.  Prescription is given for Vagifem as noted.  Patient is to call if no improvement in her symptoms as discussed.  - estradiol (VAGIFEM) 10 MCG tablet vaginal tablet; Insert 1 tablet into the vagina 2 (Two) Times a Week.  Dispense: 8 tablet; Refill: 11    5. Hormone replacement therapy (HRT)  I had a long detailed and extensive discussion with the patient regarding her hormone replacement therapy.  I have discussed with the patient the risk versus benefits at length.  Given the patient's history of migraines I recommend the patient stay off hormone replacement therapy.  Patient is in agreement with the plan.  I have discussed with the patient other alternatives for management of her symptoms.  Patient is to stay off her hormone replacement therapy at present.  She will plan a trial with Estroven.  Patient will call if worsening and/or changes in her symptoms.    Follow Up/Instructions:  Follow up as noted.  Patient was given instructions and counseling regarding her condition or for health maintenance advice. Please see specific information pulled into the AVS if appropriate.     Note: Speech recognition transcription software may have been used to dictate portions of this document.  An attempt at proofreading has been made though minor errors in transcription may still be present.    This note was electronically signed.  Breanna Mays M.D.

## 2022-08-16 LAB — REF LAB TEST METHOD: NORMAL

## 2022-08-19 ENCOUNTER — PATIENT ROUNDING (BHMG ONLY) (OUTPATIENT)
Dept: OBSTETRICS AND GYNECOLOGY | Facility: CLINIC | Age: 57
End: 2022-08-19

## 2022-08-19 NOTE — PROGRESS NOTES
August 19, 2022    Hello, may I speak with Janell Salgado?    My name is Pricilla De La Rosa    I am  with [facility]  [address], [address]  [place] [address] [phone].    Before we get started may I verify your date of birth? 1965    I am calling to officially welcome you to our practice and ask about your recent visit. Is this a good time to talk? Yes    Tell me about your visit with us. What things went well?  It went great.  Dr. Mays was very nice and listened to me.       We're always looking for ways to make our patients' experiences even better. Do you have recommendations on ways we may improve?  No    Overall were you satisfied with your first visit to our practice? Yes       I appreciate you taking the time to speak with me today. Is there anything else I can do for you? No      Thank you, and have a great day.

## 2022-10-03 ENCOUNTER — HOSPITAL ENCOUNTER (OUTPATIENT)
Facility: HOSPITAL | Age: 57
Discharge: HOME | End: 2022-10-03
Payer: COMMERCIAL

## 2022-10-03 ENCOUNTER — HOSPITAL ENCOUNTER (OUTPATIENT)
Dept: GENERAL RADIOLOGY | Facility: HOSPITAL | Age: 57
Discharge: HOME | End: 2022-10-03
Payer: COMMERCIAL

## 2022-10-03 DIAGNOSIS — R52 PAIN: ICD-10-CM

## 2022-10-03 PROCEDURE — 71046 X-RAY EXAM CHEST 2 VIEWS: CPT

## 2022-10-03 PROCEDURE — 73030 X-RAY EXAM OF SHOULDER: CPT

## 2022-12-13 ENCOUNTER — HOSPITAL ENCOUNTER (OUTPATIENT)
Dept: MRI IMAGING | Facility: HOSPITAL | Age: 57
Discharge: HOME | End: 2022-12-13
Payer: COMMERCIAL

## 2022-12-13 DIAGNOSIS — G89.29 CHRONIC LEFT SHOULDER PAIN: ICD-10-CM

## 2022-12-13 DIAGNOSIS — M25.512 CHRONIC LEFT SHOULDER PAIN: ICD-10-CM

## 2022-12-13 PROCEDURE — 73221 MRI JOINT UPR EXTREM W/O DYE: CPT

## 2023-06-05 ENCOUNTER — TRANSCRIBE ORDERS (OUTPATIENT)
Dept: ADMINISTRATIVE | Facility: HOSPITAL | Age: 58
End: 2023-06-05
Payer: COMMERCIAL

## 2023-06-05 DIAGNOSIS — Z12.31 VISIT FOR SCREENING MAMMOGRAM: Primary | ICD-10-CM

## 2023-06-08 ENCOUNTER — HOSPITAL ENCOUNTER (OUTPATIENT)
Dept: MAMMOGRAPHY | Facility: HOSPITAL | Age: 58
Discharge: HOME | End: 2023-06-08
Admitting: OBSTETRICS & GYNECOLOGY
Payer: COMMERCIAL

## 2023-06-08 DIAGNOSIS — Z12.31 VISIT FOR SCREENING MAMMOGRAM: ICD-10-CM

## 2023-06-08 PROCEDURE — 77067 SCR MAMMO BI INCL CAD: CPT

## 2023-06-08 PROCEDURE — 77063 BREAST TOMOSYNTHESIS BI: CPT

## 2024-08-27 ENCOUNTER — TRANSCRIBE ORDERS (OUTPATIENT)
Dept: ADMINISTRATIVE | Facility: HOSPITAL | Age: 59
End: 2024-08-27
Payer: COMMERCIAL

## 2024-08-27 DIAGNOSIS — Z12.31 VISIT FOR SCREENING MAMMOGRAM: Primary | ICD-10-CM

## 2024-09-09 ENCOUNTER — OFFICE VISIT (OUTPATIENT)
Dept: OBSTETRICS AND GYNECOLOGY | Facility: CLINIC | Age: 59
End: 2024-09-09
Payer: COMMERCIAL

## 2024-09-09 VITALS
BODY MASS INDEX: 27.08 KG/M2 | WEIGHT: 158.6 LBS | DIASTOLIC BLOOD PRESSURE: 80 MMHG | HEIGHT: 64 IN | SYSTOLIC BLOOD PRESSURE: 142 MMHG

## 2024-09-09 DIAGNOSIS — Z12.4 SCREENING FOR CERVICAL CANCER: ICD-10-CM

## 2024-09-09 DIAGNOSIS — Z01.419 WELL WOMAN EXAM WITH ROUTINE GYNECOLOGICAL EXAM: Primary | ICD-10-CM

## 2024-09-09 PROCEDURE — 99396 PREV VISIT EST AGE 40-64: CPT | Performed by: PHYSICIAN ASSISTANT

## 2024-09-09 RX ORDER — FLUOXETINE 10 MG/1
10 CAPSULE ORAL DAILY
COMMUNITY

## 2024-09-09 NOTE — PROGRESS NOTES
Subjective   Chief Complaint   Patient presents with    Gynecologic Exam     Last pap done 22-WNL, MMG is scheduled  Wants pap done today       Janell Salgado is a 58 y.o. year old  presenting to be seen for her annual gynecological exam.   No complaints or concerns  Has screening mammogram scheduled in December  Previous  endometrial ablation with no bleeds post ablation        Past Medical History:   Diagnosis Date    Anemia     Anxiety     Frozen shoulder     HL (hearing loss) 2019    Migraine 2017    Vision loss         Current Outpatient Medications:     cetirizine (zyrTEC) 5 MG tablet, Take 1 tablet by mouth Daily., Disp: , Rfl:     zolpidem (AMBIEN) 5 MG tablet, Take 1 tablet by mouth At Night As Needed for Sleep., Disp: , Rfl:     FLUoxetine (PROzac) 10 MG capsule, Take 1 capsule by mouth Daily., Disp: , Rfl:    Allergies   Allergen Reactions    Penicillins Hives and Rash     rash      Past Surgical History:   Procedure Laterality Date    ENDOMETRIAL ABLATION      LASER ABLATION      WISDOM TOOTH EXTRACTION        Social History     Socioeconomic History    Marital status:    Tobacco Use    Smoking status: Former     Current packs/day: 0.00     Average packs/day: 1 pack/day for 10.0 years (10.0 ttl pk-yrs)     Types: Cigarettes     Quit date: 2012     Years since quittin.6    Smokeless tobacco: Never    Tobacco comments:     8 years ago   Vaping Use    Vaping status: Never Used   Substance and Sexual Activity    Alcohol use: Not Currently    Drug use: Never    Sexual activity: Yes     Partners: Male     Birth control/protection: None      Family History   Problem Relation Age of Onset    Breast cancer Paternal Grandmother     Diabetes Paternal Grandmother     Anxiety disorder Mother     Hypertension Father     Diabetes Father     Breast cancer Maternal Grandmother     Stroke Maternal Grandfather     Stroke Paternal Grandfather     Breast cancer Sister        Review of  Systems   Constitutional:  Negative for chills, diaphoresis and fever.   Gastrointestinal: Negative.    Genitourinary:  Negative for difficulty urinating, dysuria, pelvic pain and vaginal bleeding.           Objective   /80   Wt 71.9 kg (158 lb 9.6 oz)   BMI 29.01 kg/m²     Physical Exam  Exam conducted with a chaperone present.   Constitutional:       Appearance: Normal appearance. She is well-developed and well-groomed.   Eyes:      General: Lids are normal.      Extraocular Movements: Extraocular movements intact.      Conjunctiva/sclera: Conjunctivae normal.   Neck:      Thyroid: No thyroid mass.   Chest:   Breasts:     Breasts are symmetrical.      Right: No inverted nipple, mass, nipple discharge, skin change or tenderness.      Left: No inverted nipple, mass, nipple discharge, skin change or tenderness.   Abdominal:      General: There is no distension.      Palpations: Abdomen is soft.      Tenderness: There is no abdominal tenderness.   Genitourinary:     Exam position: Lithotomy position.      Labia:         Right: No rash, tenderness or lesion.         Left: No rash, tenderness or lesion.       Urethra: No prolapse, urethral pain, urethral swelling or urethral lesion.      Vagina: No vaginal discharge, tenderness or lesions.      Cervix: No cervical motion tenderness, discharge, friability or lesion.      Uterus: Not enlarged and not tender.       Adnexa:         Right: No mass or tenderness.          Left: No mass or tenderness.     Musculoskeletal:      Cervical back: Neck supple.   Lymphadenopathy:      Upper Body:      Right upper body: No axillary adenopathy.      Left upper body: No axillary adenopathy.   Skin:     General: Skin is warm and dry.      Findings: No lesion.   Neurological:      General: No focal deficit present.      Mental Status: She is alert and oriented to person, place, and time.   Psychiatric:         Attention and Perception: Attention normal.         Mood and Affect:  Mood normal.         Speech: Speech normal.         Behavior: Behavior normal.         Thought Content: Thought content normal.            Result Review :                   Assessment and Plan  Diagnoses and all orders for this visit:    1. Well woman exam with routine gynecological exam (Primary)    2. Screening for cervical cancer  -     LIQUID-BASED PAP SMEAR WITH HPV GENOTYPING REGARDLESS OF INTERPRETATION ([facility])      Patient Instructions   Self breast exam monthly  Annual mammogram  Calcium 1200 mg daily and vit D 2000 mg daily  Regular excercise            This note was electronically signed.    Helena Sosa PA-C   September 9, 2024

## 2024-09-15 LAB — REF LAB TEST METHOD: NORMAL

## 2024-12-17 ENCOUNTER — HOSPITAL ENCOUNTER (OUTPATIENT)
Dept: MAMMOGRAPHY | Facility: HOSPITAL | Age: 59
Discharge: HOME | End: 2024-12-17
Admitting: OBSTETRICS & GYNECOLOGY
Payer: COMMERCIAL

## 2024-12-17 DIAGNOSIS — Z12.31 VISIT FOR SCREENING MAMMOGRAM: ICD-10-CM

## 2024-12-17 PROCEDURE — 77063 BREAST TOMOSYNTHESIS BI: CPT

## 2024-12-17 PROCEDURE — 77067 SCR MAMMO BI INCL CAD: CPT
